# Patient Record
Sex: MALE | Race: BLACK OR AFRICAN AMERICAN | NOT HISPANIC OR LATINO | URBAN - METROPOLITAN AREA
[De-identification: names, ages, dates, MRNs, and addresses within clinical notes are randomized per-mention and may not be internally consistent; named-entity substitution may affect disease eponyms.]

---

## 2019-08-06 ENCOUNTER — TELEPHONE (OUTPATIENT)
Dept: SPORTS MEDICINE | Facility: CLINIC | Age: 24
End: 2019-08-06

## 2019-08-06 NOTE — TELEPHONE ENCOUNTER
----- Message from Nia Scott sent at 8/6/2019 12:47 PM CDT -----  Contact: Self  Can you schedule with us? Next available NP slot.     ----- Message -----  From: Antoinette Muniz  Sent: 8/6/2019  12:21 PM  To: [facility] Staff    Pt stated that he would like if possible to get a sooner than 09/25 appt for his right knee/ sports related injury     Contact pt @ [phone] or [phone]

## 2019-08-13 ENCOUNTER — OFFICE VISIT (OUTPATIENT)
Dept: SPORTS MEDICINE | Facility: CLINIC | Age: 24
End: 2019-08-13
Payer: MEDICAID

## 2019-08-13 ENCOUNTER — HOSPITAL ENCOUNTER (OUTPATIENT)
Dept: RADIOLOGY | Facility: HOSPITAL | Age: 24
Discharge: HOME | End: 2019-08-13
Attending: ORTHOPAEDIC SURGERY
Payer: MEDICAID

## 2019-08-13 VITALS
HEIGHT: 72 IN | DIASTOLIC BLOOD PRESSURE: 80 MMHG | HEART RATE: 98 BPM | WEIGHT: 315 LBS | BODY MASS INDEX: 42.66 KG/M2 | SYSTOLIC BLOOD PRESSURE: 125 MMHG

## 2019-08-13 DIAGNOSIS — M17.5 OTHER SECONDARY OSTEOARTHRITIS OF RIGHT KNEE: Primary | ICD-10-CM

## 2019-08-13 DIAGNOSIS — S83.511A COMPLETE TEAR OF ANTERIOR CRUCIATE LIGAMENT OF RIGHT KNEE, INITIAL ENCOUNTER: ICD-10-CM

## 2019-08-13 DIAGNOSIS — M25.561 RIGHT KNEE PAIN, UNSPECIFIED CHRONICITY: ICD-10-CM

## 2019-08-13 PROCEDURE — 73564 X-RAY EXAM KNEE 4 OR MORE: CPT | Mod: 26,RT,, | Performed by: RADIOLOGY

## 2019-08-13 PROCEDURE — 99213 OFFICE O/P EST LOW 20 MIN: CPT | Mod: PBBFAC,25,PO | Performed by: ORTHOPAEDIC SURGERY

## 2019-08-13 PROCEDURE — 99999 PR PBB SHADOW E&M-EST. PATIENT-LVL III: ICD-10-PCS | Mod: PBBFAC,,, | Performed by: ORTHOPAEDIC SURGERY

## 2019-08-13 PROCEDURE — 73562 XR KNEE ORTHO RIGHT WITH FLEXION: ICD-10-PCS | Mod: 26,59,LT, | Performed by: RADIOLOGY

## 2019-08-13 PROCEDURE — 73562 X-RAY EXAM OF KNEE 3: CPT | Mod: 26,59,LT, | Performed by: RADIOLOGY

## 2019-08-13 PROCEDURE — 99203 OFFICE O/P NEW LOW 30 MIN: CPT | Mod: S$PBB,,, | Performed by: ORTHOPAEDIC SURGERY

## 2019-08-13 PROCEDURE — 73562 X-RAY EXAM OF KNEE 3: CPT | Mod: TC,FY,PO,LT,59

## 2019-08-13 PROCEDURE — 73564 XR KNEE ORTHO RIGHT WITH FLEXION: ICD-10-PCS | Mod: 26,RT,, | Performed by: RADIOLOGY

## 2019-08-13 PROCEDURE — 99999 PR PBB SHADOW E&M-EST. PATIENT-LVL III: CPT | Mod: PBBFAC,,, | Performed by: ORTHOPAEDIC SURGERY

## 2019-08-13 PROCEDURE — 99203 PR OFFICE/OUTPT VISIT, NEW, LEVL III, 30-44 MIN: ICD-10-PCS | Mod: S$PBB,,, | Performed by: ORTHOPAEDIC SURGERY

## 2019-08-13 NOTE — PROGRESS NOTES
CC: Right knee pain    24 y.o. Male who presents as a new patient to me. 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 He is a sophomore by credit and last played football 3 years ago. He has a remote history of injuring knee on 3 separate occasions through a twisting pivoting mechanism, twice while playing basketball and the third time getting out of a vehicle while he was moving a mattress. He self managed his pain with rest and activity modifications and never sought treatment. Today his pain localizes to anterior medial knee and is worsened with short dashes, sprints and runs when playing football and relieved with rest. He feels is not able to perform at his level due to pain. Denies swelling or effusions. No prominent mechanical symptoms. Denies instability. Treatment thus far has included rest, activity modifications. Here today to discuss diagnosis and treatment options as he is interested in performing at his optimal level in football. He had an MRI of his knees about a couple weeks ago.       PMHx- unremarkable other than stated below. BMI 47  Negative for tobacco.   Positive for diabetes.     Pain Score: 0-No pain    REVIEW OF SYSTEMS:   Constitution: Negative. Negative for chills, fever and night sweats.    Hematologic/Lymphatic: Negative for bleeding problem. Does not bruise/bleed easily.   Skin: Negative for dry skin, itching and rash.   Musculoskeletal: Negative for falls. Positive for right knee pain and  muscle weakness.     All other review of symptoms were reviewed and found to be noncontributory.     PAST MEDICAL HISTORY:   History reviewed. No pertinent past medical history.    PAST SURGICAL HISTORY:   History reviewed. No pertinent surgical history.    FAMILY HISTORY:   History reviewed. No pertinent family history.    SOCIAL HISTORY:   Social History     Socioeconomic History    Marital status:  Single     Spouse name: Not on file    Number of children: Not on file    Years of education: Not on file    Highest education level: Not on file   Occupational History    Not on file   Social Needs    Financial resource strain: Not on file    Food insecurity:     Worry: Not on file     Inability: Not on file    Transportation needs:     Medical: Not on file     Non-medical: Not on file   Tobacco Use    Smoking status: Never Smoker    Smokeless tobacco: Never Used   Substance and Sexual Activity    Alcohol use: Not on file    Drug use: Not on file    Sexual activity: Not on file   Lifestyle    Physical activity:     Days per week: Not on file     Minutes per session: Not on file    Stress: Not on file   Relationships    Social connections:     Talks on phone: Not on file     Gets together: Not on file     Attends [employer] service: Not on file     Active member of club or organization: Not on file     Attends meetings of clubs or organizations: Not on file     Relationship status: Not on file   Other Topics Concern    Not on file   Social History Narrative    Not on file       MEDICATIONS:   No current outpatient medications on file.    ALLERGIES:   Review of patient's allergies indicates:  No Known Allergies     PHYSICAL EXAMINATION:  /80   Pulse 98   Ht 6' (1.829 m)   Wt (!) 159.2 kg (351 lb)   BMI 47.60 kg/m²   General: Well-developed overweight 24 y.o. male in no acute distress   Cardiovascular: Regular rhythm by palpation of distal pulse, normal color and temperature, no concerning varicosities on symptomatic side   Lungs: No labored breathing or wheezing appreciated   Neuro: Alert and oriented ×3   Psychiatric: well oriented to person, place and time, demonstrates normal mood and affect   Skin: No rashes, lesions or ulcers, normal temperature, turgor, and texture on involved extremity    Ortho/SPM Exam  Exam of the right knee demonstrates varus alignment. Range of motion from full  "extension to 120° of flexion with some retropatellar crepitus.  He has got no significant medial or lateral joint line tenderness.  Negative Elsy's test.  Negative posterior drawer. Positive Lachman, grade 2B.  Unable to elicit a pivot shift test.  Stable to varus and valgus stress at 0 and 30°.  No effusion.    IMAGING:  X-rays including standing, weight bearing AP and flexion bilateral knees, RIGHT knee lateral and sunrise views ordered and images reviewed by me show: mild varus deformity with medial joint interval narrowing.        MRI retrieved from outside facility and reviewed by me and discussed with patient. Study shows absent ACL with complex tear of posterior horn of medial meniscus.      ASSESSMENT:      ICD-10-CM ICD-9-CM   1. Other secondary osteoarthritis of right knee M17.5 715.26   2. Complete tear of anterior cruciate ligament of right knee, initial encounter S83.511A 844.2   3. Right knee pain, unspecified chronicity M25.561 719.46       PLAN:     The patient has a significant "at risk" knee.  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  Complicating factor is his morbid obesity.  Given the condition of his knee, I have counseled against further football participation.  I believe in this case he can only do further damage with continued contact sports.  Unfortunately a brace would not be able to adequately protect against the ACL deficiency.  The patient needs weight loss and a low-impact cardio exercise program.  At some point, if his pain worsens or persists despite these measures, he would need to consider an unloading high tibial osteotomy for his malalignment. We had a long discussion today regarding these options. All questions answered.      Procedures    "